# Patient Record
Sex: FEMALE | Race: WHITE
[De-identification: names, ages, dates, MRNs, and addresses within clinical notes are randomized per-mention and may not be internally consistent; named-entity substitution may affect disease eponyms.]

---

## 2017-11-03 ENCOUNTER — HOSPITAL ENCOUNTER (EMERGENCY)
Dept: HOSPITAL 10 - FTE | Age: 47
Discharge: HOME | End: 2017-11-03
Payer: COMMERCIAL

## 2017-11-03 VITALS — BODY MASS INDEX: 45.41 KG/M2 | WEIGHT: 196.21 LBS | HEIGHT: 55 IN

## 2017-11-03 DIAGNOSIS — I10: ICD-10-CM

## 2017-11-03 DIAGNOSIS — E66.01: ICD-10-CM

## 2017-11-03 DIAGNOSIS — Z48.01: Primary | ICD-10-CM

## 2017-11-03 DIAGNOSIS — E11.9: ICD-10-CM

## 2017-11-03 PROCEDURE — 99281 EMR DPT VST MAYX REQ PHY/QHP: CPT

## 2017-11-03 NOTE — ERD
ER Documentation


Chief Complaint


Chief Complaint


recheck of abscess to r underarm





HPI


This a 46-year-old female presents the emergency department today for wound 

check of abscesses that she had drained 2 days ago.  Patient states she is 

taking her antibiotics as prescribed.  States she is taking her diabetes 

medication.  Denies any fevers or chills.





ROS


All systems reviewed and are negative except as per history of present illness.





Medications


Home Meds


Active Scripts


Acetaminophen* (Tylophen*) 500 Mg Capsule, 1 CAP PO Q6H Y for PAIN AND OR 

ELEVATED TEMP, #20 CAP


   Prov:DI MURPHY PA-C         11/1/17


Clindamycin Hcl* (Clindamycin Hcl*) 300 Mg Capsule, 300 MG PO TID for 10 Days, 

CAP


   Prov:DI MURPHY PA-C         11/1/17


Mupirocin* (Bactroban*) 2% -22 Gram Oint...g., 1 APPLIC IN BID for 7 Days, EA


   Prov:DI MURPHY PA-C         11/1/17





PMhx/Soc


History of Surgery:  No


Anesthesia Reaction:  No


Hx Neurological Disorder:  No


Hx Respiratory Disorders:  No


Hx Cardiac Disorders:  Yes (htn, cholesterol )


Hx Psychiatric Problems:  No


Hx Miscellaneous Medical Probl:  Yes (dm)


Hx Alcohol Use:  No


Hx Substance Use:  No


Hx Tobacco Use:  No





Physical Exam


Vitals





Vital Signs








  Date Time  Temp Pulse Resp B/P Pulse Ox O2 Delivery O2 Flow Rate FiO2


 


11/3/17 09:57 98.0 78 18 180/84 99   








Physical Exam


Const:     morbidly obese, NAD


Head:   Atraumatic 


Eyes:    Normal Conjunctiva


ENT:    Normal External Ears, Nose and Mouth.


Neck:               Full range of motion..~ No meningismus.


Resp:    Clear to auscultation bilaterally


Cardio:    Regular rate and rhythm, no murmurs


Abd:    Soft, non tender, non distended. Normal bowel sounds


Skin:   Right groin and right abdomen abscesses with evidence of packing.  Mild 

drainage.  No erythema or warmth.  Right axilla abscess is draining.  No 

evidence of packing


Back:    No midline or flank tenderness


Ext:    No cyanosis, or edema


Neur:    Awake and alert


Psych:    Normal Mood and Affect





Procedures/MDM


This is a 46-year-old female who presents the emergency department today for 

wound check of abscess that she had drained 2 days ago.  On review of patient's 

medical records she was seen here 2 days ago and had her abscesses drained at 

that time.  There was wound packing in place in the lower abdomen and groin 

area.  Patient is afebrile and otherwise well-appearing.  Her packing was 

removed.  There is also some drainage from the axilla abscess.  This was not 

packed.  Patient symptoms at this time most consistent with multiple abscesses.

  Patient has a history of diabetes.  She was instructed to continue taking her 

antibiotics as prescribed.  She may follow back with her primary care doctor. 

Do not feel that that she requires further evaluation or management at this 

time.  Wounds were redressed here in the emergency department. Low suspicion 

for sepsis, deep space tracking infection.





At this time the patient is stable for discharge and outpatient management. 

Patient should follow up with their PCP in the next 1-2 days.  They may return 

to the emergency department sooner for any persistent or worsening of symptoms.

  Patient understood and agreed with the plan.





Departure


Diagnosis:  


 Primary Impression:  


 Wound check, abscess


Condition:  Fair


Patient Instructions:  Wound Care


Referrals:  


your PCP





Additional Instructions:  


Llame al doctor CESAR y gregory richie JACINTO PARA DENTRO DE 1-2 ABDI.Dgale a la 

secretaria que nosotros le instruimos hacer esta jacinto.Avise o llame si mark 

condicin se empeora antes de la jacinto. Regresa aqui si peor o no mejor.








Keep wounds clean and dry.  Take antibiotics as prescribed.  Take your diabetes 

medications as prescribed











JUAN SOLORZANO PA-C Nov 3, 2017 11:48

## 2018-04-02 ENCOUNTER — HOSPITAL ENCOUNTER (EMERGENCY)
Dept: HOSPITAL 91 - FTE | Age: 48
Discharge: HOME | End: 2018-04-02
Payer: COMMERCIAL

## 2018-04-02 ENCOUNTER — HOSPITAL ENCOUNTER (EMERGENCY)
Age: 48
Discharge: HOME | End: 2018-04-02

## 2018-04-02 DIAGNOSIS — I10: ICD-10-CM

## 2018-04-02 DIAGNOSIS — E11.9: ICD-10-CM

## 2018-04-02 DIAGNOSIS — N39.0: Primary | ICD-10-CM

## 2018-04-02 DIAGNOSIS — J06.9: ICD-10-CM

## 2018-04-02 LAB
URINE BLOOD (DIP) POC: (no result)
URINE KETONES (DIP) POC: (no result)
URINE LEUKOCYTE EST (DIP) POC: (no result)
URINE PH (DIP) POC: 5 (ref 5–8.5)
URINE TOTAL PROTEIN POC: (no result)

## 2018-04-02 PROCEDURE — 99284 EMERGENCY DEPT VISIT MOD MDM: CPT

## 2018-04-02 PROCEDURE — 81003 URINALYSIS AUTO W/O SCOPE: CPT

## 2021-05-27 NOTE — ERD
Chronic Hypertension   AMBULATORY CARE:   Hypertension  is high blood pressure  Your blood pressure is the force of your blood moving against the walls of your arteries  Hypertension causes your blood pressure to get so high that your heart has to work much harder than normal  This can damage your heart  Even if you have hypertension for years, lifestyle changes, medicines, or both can help bring your blood pressure to normal   Call 911 for any of the following:   · You have chest pain  · You have any of the following signs of a heart attack:      ? Squeezing, pressure, or pain in your chest    ? You may  also have any of the following:     § Discomfort or pain in your back, neck, jaw, stomach, or arm    § Shortness of breath    § Nausea or vomiting    § Lightheadedness or a sudden cold sweat    · You become confused or have difficulty speaking  · You suddenly feel lightheaded or have trouble breathing  Seek care immediately if:   · You have a severe headache or vision loss  · You have weakness in an arm or leg  Contact your healthcare provider if:   · You feel faint, dizzy, confused, or drowsy  · You have been taking your blood pressure medicine but your pressure is higher than your provider says it should be  · You have questions or concerns about your condition or care  Treatment for chronic hypertension  may include medicine to lower your blood pressure and cholesterol levels  A low cholesterol level helps prevent heart disease and makes it easier to control your blood pressure  Heart disease can make your blood pressure harder to control  You may also need to make lifestyle changes  What you need to know about the stages of hypertension:       · Normal blood pressure is 119/79 or lower   Your healthcare provider may only check your blood pressure each year if it stays at a normal level  · Elevated blood pressure is 120/79 to 129/79   This is sometimes called prehypertension   Your ER Documentation


Chief Complaint


Chief Complaint


recheck of abscess to r underarm





HPI


This a 46-year-old female presents the emergency department today for wound 

check of abscesses that she had drained 2 days ago.  Patient states she is 

taking her antibiotics as prescribed.  States she is taking her diabetes 

medication.  Denies any fevers or chills.





ROS


All systems reviewed and are negative except as per history of present illness.





Medications


Home Meds


Active Scripts


Acetaminophen* (Tylophen*) 500 Mg Capsule, 1 CAP PO Q6H Y for PAIN AND OR 

ELEVATED TEMP, #20 CAP


   Prov:DI MURPHY PA-C         11/1/17


Clindamycin Hcl* (Clindamycin Hcl*) 300 Mg Capsule, 300 MG PO TID for 10 Days, 

CAP


   Prov:DI MURPHY PA-C         11/1/17


Mupirocin* (Bactroban*) 2% -22 Gram Oint...g., 1 APPLIC IN BID for 7 Days, EA


   Prov:DI MURPHY PA-C         11/1/17





PMhx/Soc


History of Surgery:  No


Anesthesia Reaction:  No


Hx Neurological Disorder:  No


Hx Respiratory Disorders:  No


Hx Cardiac Disorders:  Yes (htn, cholesterol )


Hx Psychiatric Problems:  No


Hx Miscellaneous Medical Probl:  Yes (dm)


Hx Alcohol Use:  No


Hx Substance Use:  No


Hx Tobacco Use:  No





Physical Exam


Vitals





Vital Signs








  Date Time  Temp Pulse Resp B/P Pulse Ox O2 Delivery O2 Flow Rate FiO2


 


11/3/17 09:57 98.0 78 18 180/84 99   








Physical Exam


Const:     morbidly obese, NAD


Head:   Atraumatic 


Eyes:    Normal Conjunctiva


ENT:    Normal External Ears, Nose and Mouth.


Neck:               Full range of motion..~ No meningismus.


Resp:    Clear to auscultation bilaterally


Cardio:    Regular rate and rhythm, no murmurs


Abd:    Soft, non tender, non distended. Normal bowel sounds


Skin:   Right groin and right abdomen abscesses with evidence of packing.  Mild 

drainage.  No erythema or warmth.  Right axilla abscess is draining.  No 

evidence of packing


Back:    No midline or flank tenderness


Ext:    No cyanosis, or edema


Neur:    Awake and alert


Psych:    Normal Mood and Affect





Procedures/MDM


This is a 46-year-old female who presents the emergency department today for 

wound check of abscess that she had drained 2 days ago.  On review of patient's 

medical records she was seen here 2 days ago and had her abscesses drained at 

that time.  There was wound packing in place in the lower abdomen and groin 

area.  Patient is afebrile and otherwise well-appearing.  Her packing was 

removed.  There is also some drainage from the axilla abscess.  This was not 

packed.  Patient symptoms at this time most consistent with multiple abscesses.

  Patient has a history of diabetes.  She was instructed to continue taking her 

antibiotics as prescribed.  She may follow back with her primary care doctor. 

Do not feel that that she requires further evaluation or management at this 

time.  Wounds were redressed here in the emergency department. Low suspicion 

for sepsis, deep space tracking infection.





At this time the patient is stable for discharge and outpatient management. 

Patient should follow up with their PCP in the next 1-2 days.  They may return 

to the emergency department sooner for any persistent or worsening of symptoms.

  Patient understood and agreed with the plan.





Departure


Diagnosis:  


 Primary Impression:  


 Wound check, abscess


Condition:  Fair


Patient Instructions:  Wound Care


Referrals:  


your PCP





Additional Instructions:  


Llame al doctor CESAR y gregory richie JACINTO PARA DENTRO DE 1-2 ABDI.Dgale a la 

secretaria que nosotros le instruimos hacer esta jacinto.Avise o llame si mark 

condicin se empeora antes de la jacinto. Regresa aqui si peor o no mejor.








Keep wounds clean and dry.  Take antibiotics as prescribed.  Take your diabetes 

medications as prescribed











JUAN SOLORZANO PA-C Nov 3, 2017 11:48 healthcare provider may suggest lifestyle changes to help lower your blood pressure to a normal level  He or she may then check it again in 3 to 6 months  · Stage 1 hypertension is 130/80  to 139/89   Your provider may recommend lifestyle changes, medication, and checks every 3 to 6 months until your blood pressure is controlled  · Stage 2 hypertension is 140/90 or higher   Your provider will recommend lifestyle changes and have you take 2 kinds of hypertension medicines  You will also need to have your blood pressure checked monthly until it is controlled  Manage chronic hypertension:   · Check your blood pressure at home  Avoid smoking, caffeine, and exercise at least 30 minutes before checking your blood pressure  Sit and rest for 5 minutes before you take your blood pressure  Extend your arm and support it on a flat surface  Your arm should be at the same level as your heart  Follow the directions that came with your blood pressure monitor  Check your blood pressure 2 times, 1 minute apart, before you take your medicine in the morning  Also check your blood pressure before your evening meal  Keep a record of your readings and bring it to your follow-up visits  Ask your healthcare provider what your blood pressure should be  · Manage any other health conditions you have  Health conditions such as diabetes can increase your risk for hypertension  Follow your healthcare provider's instructions and take all your medicines as directed  Talk to your healthcare provider about any new health conditions you have recently developed  · Ask about all medicines  Certain medicines can increase your blood pressure  Examples include oral birth control pills, decongestants, herbal supplements, and NSAIDs, such as ibuprofen  Your healthcare provider can tell you which medicines are safe for you to take  This includes prescription and over-the-counter medicines      Lifestyle changes you can make to lower your blood pressure: Your provider may want you to make more lifestyle changes if you are having trouble controlling your blood pressure  This may feel difficult over time, especially if you think you are making good changes but your pressure is still high  It might help to focus on one new change at a time  For example, try to add 1 more day of exercise, or exercise for an extra 10 minutes on 2 days  Small changes can make a big difference  Your healthcare provider can also refer you to specialists such as a dietitian who can help you make small changes  · Limit sodium (salt) as directed  Too much sodium can affect your fluid balance  Check labels to find low-sodium or no-salt-added foods  Some low-sodium foods use potassium salts for flavor  Too much potassium can also cause health problems  Your healthcare provider will tell you how much sodium and potassium are safe for you to have in a day  He or she may recommend that you limit sodium to 2,300 mg a day  · Follow the meal plan recommended by your healthcare provider  A dietitian or your provider can give you more information on low-sodium plans or the DASH (Dietary Approaches to Stop Hypertension) eating plan  The DASH plan is low in sodium, unhealthy fats, and total fat  It is high in potassium, calcium, and fiber  · Exercise to maintain a healthy weight  Exercise at least 30 minutes per day, on most days of the week  This will help decrease your blood pressure  Ask your healthcare provider about the best exercise plan for you  · Decrease stress  This may help lower your blood pressure  Learn ways to relax, such as deep breathing or listening to music  · Limit alcohol as directed  Alcohol can increase your blood pressure  A drink of alcohol is 12 ounces of beer, 5 ounces of wine, or 1½ ounces of liquor  · Do not smoke    Nicotine and other chemicals in cigarettes and cigars can increase your blood pressure and also cause lung damage  Ask your healthcare provider for information if you currently smoke and need help to quit  E-cigarettes or smokeless tobacco still contain nicotine  Talk to your healthcare provider before you use these products  Follow up with your healthcare provider as directed: You will need to return to have your blood pressure checked and to have other lab tests done  Write down your questions so you remember to ask them during your visits  © Copyright [address] Information is for End User's use only and may not be sold, redistributed or otherwise used for commercial purposes  All illustrations and images included in CareNotes® are the copyrighted property of [employer]  or [address] [place] [address]  The above information is an  only  It is not intended as medical advice for individual conditions or treatments  Talk to your doctor, nurse or pharmacist before following any medical regimen to see if it is safe and effective for you